# Patient Record
Sex: FEMALE | Race: WHITE | NOT HISPANIC OR LATINO | Employment: PART TIME | URBAN - METROPOLITAN AREA
[De-identification: names, ages, dates, MRNs, and addresses within clinical notes are randomized per-mention and may not be internally consistent; named-entity substitution may affect disease eponyms.]

---

## 2022-11-07 ENCOUNTER — OFFICE VISIT (OUTPATIENT)
Dept: OBSTETRICS AND GYNECOLOGY | Facility: CLINIC | Age: 22
End: 2022-11-07

## 2022-11-07 VITALS — WEIGHT: 140 LBS | DIASTOLIC BLOOD PRESSURE: 60 MMHG | SYSTOLIC BLOOD PRESSURE: 116 MMHG | RESPIRATION RATE: 16 BRPM

## 2022-11-07 DIAGNOSIS — Z30.011 ENCOUNTER FOR INITIAL PRESCRIPTION OF CONTRACEPTIVE PILLS: ICD-10-CM

## 2022-11-07 DIAGNOSIS — N39.0 RECURRENT UTI: ICD-10-CM

## 2022-11-07 DIAGNOSIS — Z12.4 PAP SMEAR FOR CERVICAL CANCER SCREENING: ICD-10-CM

## 2022-11-07 DIAGNOSIS — Z20.2 POSSIBLE EXPOSURE TO STD: ICD-10-CM

## 2022-11-07 DIAGNOSIS — Z01.419 ENCOUNTER FOR GYNECOLOGICAL EXAMINATION WITHOUT ABNORMAL FINDING: Primary | ICD-10-CM

## 2022-11-07 PROCEDURE — 99385 PREV VISIT NEW AGE 18-39: CPT | Performed by: NURSE PRACTITIONER

## 2022-11-07 RX ORDER — NORGESTIMATE AND ETHINYL ESTRADIOL 0.25-0.035
KIT ORAL
COMMUNITY
Start: 2022-09-12 | End: 2022-11-07 | Stop reason: ALTCHOICE

## 2022-11-07 RX ORDER — METHENAMINE HIPPURATE 1000 MG/1
1 TABLET ORAL 2 TIMES DAILY
COMMUNITY
Start: 2022-10-03

## 2022-11-07 NOTE — PROGRESS NOTES
Annual Visit     Patient Name: Deyanira Telles  : 2000   MRN: [id number]   Care Team: Patient Care Team:  Provider, No Known as PCP - Aurora Fairchild APRN as Nurse Practitioner (Nurse Practitioner)    Chief Complaint:    Chief Complaint   Patient presents with   • Annual Exam     Recurrent UTI's, has been to the urologist recently       HPI: Deyanira Telles is a 22 y.o. year old  presenting to be seen for her gynecologic exam - new pt.  1st pap smear today     Sexually active in a monogamous relationship   Taking Estarylla COCs - c/o mood changes with pills   Would like to discuss other options   Has taken Blisovi before with BTB     Hx recurrent UTIs   Has had 6 this calendar yr   Saw Dr. Varela recently and was given medication to try - states she had side effects with it so she stopped taking it   Has f/u scheduled with him in 3 months   States approx 3 of the UTIs occurred after intercourse, but the other 3 didn't     C/o dyspareunia but not always only occasionally   No postcoital bldg   No vaginal c/o   States sometimes she does have vaginal itching but typically occurs around the first couple of days before and after her period     She works part time currently at a medical office at [facility]     Subjective      I have reviewed the patients family history, social history, past medical history, past surgical history and have updated it as appropriate.    /60   Resp 16   Wt 63.5 kg (140 lb)   LMP 10/17/2022     BMI reviewed: There is no height or weight on file to calculate BMI.      Objective     Physical Exam    Neuro: alert and oriented to person, place and time   General:  alert; cooperative; well developed; well nourished   Skin:  No suspicious lesions seen   Thyroid: normal to inspection and palpation   Lungs:  breathing is unlabored  clear to auscultation bilaterally   Heart:  regular rate and rhythm, S1, S2 normal, no murmur, click, rub or gallop  normal  apical impulse   Breasts:  Examined in supine position  Symmetric without masses or skin dimpling  Nipples normal without inversion, lesions or discharge  There are no palpable axillary nodes  Fibrocystic changes are present both breasts without a discrete mass   Abdomen: soft, non-tender; no masses  no umbilical or inguinal hernias are present  no hepato-splenomegaly   Pelvis: Clinical staff was present for exam  External genitalia:  normal appearance of the external genitalia including Bartholin's and Walthill's glands.  :  urethral meatus normal;  Vaginal:  normal pink mucosa without prolapse or lesions.  Cervix:  normal appearance.  Uterus:  normal size, shape and consistency.  Adnexa:  normal bimanual exam of the adnexa.  Rectal:  digital rectal exam not performed; anus visually normal appearing.         Assessment / Plan      Assessment  Problems Addressed This Visit    ICD-10-CM ICD-9-CM   1. Encounter for gynecological examination without abnormal finding  Z01.419 V72.31   2. Pap smear for cervical cancer screening  Z12.4 V76.2   3. Possible exposure to STD  Z20.2 V01.6   4. Encounter for initial prescription of contraceptive pills  Z30.011 V25.01   5. Recurrent UTI  N39.0 599.0       Plan    Pap smear and STI screening pending   Discussed monthly SBEs and fibrocystic breast changes     No C/is to COCs   Will complete current pill pkg then start Nextstellis - samples x 2 months given   Will call for script before samples completed - discussed [facility]   F/u in office in 3 months  Discussed condom use during first 2 wks of Nextstellis   Discussed avoidance of NSAIDs with nextstellis     She will monitor dyspareunia and see if positional related   If postcoital bldg develops or if not related position, she will RTC     Discussed UTI prevention measures   She will contact Dr. De La Torre's office to let him know that she stopped medication and to f/u     [facility] 1 yr   F/u in office in 3 months          19 to 39: Counseling/Anticipatory Guidance Discussed: family planning/contraception, sexual behavior and STDs and breast cancer and self breast exams    Follow Up  Return in about 3 months (around 2/7/2023) for Recheck.  Patient was given instructions and counseling regarding her condition or for health maintenance advice. Please see specific information pulled into the AVS if appropriate.     Aurora Lemus, CHEYANNE  November 7, 2022  11:32 EST

## 2022-11-08 ENCOUNTER — PATIENT ROUNDING (BHMG ONLY) (OUTPATIENT)
Dept: OBSTETRICS AND GYNECOLOGY | Facility: CLINIC | Age: 22
End: 2022-11-08

## 2022-11-08 NOTE — PROGRESS NOTES
A [employer] message has been sent to the patient for PATIENT ROUNDING with [facility].

## 2022-11-10 LAB — REF LAB TEST METHOD: NORMAL

## 2022-12-15 ENCOUNTER — TELEPHONE (OUTPATIENT)
Dept: OBSTETRICS AND GYNECOLOGY | Facility: CLINIC | Age: 22
End: 2022-12-15

## 2022-12-15 RX ORDER — DROSPIRENONE AND ESTETROL 3-14.2(28)
1 KIT ORAL DAILY
Qty: 84 TABLET | Refills: 3 | Status: SHIPPED | OUTPATIENT
Start: 2022-12-15 | End: 2022-12-15 | Stop reason: SDUPTHER

## 2022-12-15 RX ORDER — DROSPIRENONE AND ESTETROL 3-14.2(28)
1 KIT ORAL DAILY
Qty: 84 TABLET | Refills: 3 | Status: SHIPPED | OUTPATIENT
Start: 2022-12-15 | End: 2023-04-05 | Stop reason: ALTCHOICE

## 2022-12-15 NOTE — TELEPHONE ENCOUNTER
Please let her know that I sent in a script for Nextstellis to [facility].  I typically send it to [facility] like we discussed at her visit, in order to get the best price, so if I need to send it there, just let me know.

## 2022-12-15 NOTE — TELEPHONE ENCOUNTER
PT CALLING WAS GIVEN A SAMPLE OF BIRTH CONTROL AND SHE LIKES IT IS ASKING FOR A FULL PRESCRIPTION BE SENT TO [facility]

## 2023-04-05 ENCOUNTER — OFFICE VISIT (OUTPATIENT)
Dept: OBSTETRICS AND GYNECOLOGY | Facility: CLINIC | Age: 23
End: 2023-04-05
Payer: COMMERCIAL

## 2023-04-05 VITALS — DIASTOLIC BLOOD PRESSURE: 70 MMHG | SYSTOLIC BLOOD PRESSURE: 116 MMHG | RESPIRATION RATE: 16 BRPM | WEIGHT: 141 LBS

## 2023-04-05 DIAGNOSIS — Z30.011 ENCOUNTER FOR INITIAL PRESCRIPTION OF CONTRACEPTIVE PILLS: Primary | ICD-10-CM

## 2023-04-05 DIAGNOSIS — R53.83 FATIGUE, UNSPECIFIED TYPE: ICD-10-CM

## 2023-04-05 DIAGNOSIS — N92.1 BREAKTHROUGH BLEEDING ON BIRTH CONTROL PILLS: ICD-10-CM

## 2023-04-05 PROCEDURE — 99214 OFFICE O/P EST MOD 30 MIN: CPT | Performed by: NURSE PRACTITIONER

## 2023-04-05 RX ORDER — NORGESTREL AND ETHINYL ESTRADIOL 0.3-0.03MG
1 KIT ORAL DAILY
Qty: 28 TABLET | Refills: 3 | Status: SHIPPED | OUTPATIENT
Start: 2023-04-05

## 2023-04-05 NOTE — PROGRESS NOTES
Problem Visit     Patient Name: Deyanira Telles  : 2000   MRN: [id number]   Care Team: Patient Care Team:  Provider, No Known as PCP - Aurora Fairchild APRN as Nurse Practitioner (Nurse Practitioner)    Chief Complaint:    Chief Complaint   Patient presents with   • Follow-up     ocp's       HPI: Deyanira Telles is a 22 y.o. year old  presenting to be seen for 3 month f/u Nexstellis start.   Changed to Nexstellis at annual visit in Nov d/t mood changes with Estarylla   Has been taking Nextstellis now for 3 full months - at end of current pill pkg   States period begins during the placebo pills, may be the first day or last day, and then continues 3-4 days into new pill pkg   This has been consistent each month   Mood changes are also present - increase in anxiety   Decreased libido as well     Mood changes with Estarylla   Has taken Blisovi before with extended periods - would have bldg into 1st wk of pill pkg   Faithful with daily pill use - no late or missed pills      Hx recurrent UTIs   Seeing Dr. Varela but not currently taking medication he prescribed for prevention and he is aware of this   She is doing ok so far without it      C/o dyspareunia but not always only occasionally - she does confirm that this is with certain positions only   No postcoital bldg   No vaginal c/o   States sometimes she does have vaginal itching but typically occurs around the first couple of days before and after her period  She does also notice green vaginal d/c around the time of her period but always resolves   Denies s/sx today       Subjective      I have reviewed the patients family history, social history, past medical history, past surgical history and have updated it as appropriate.    /70   Resp 16   Wt 64 kg (141 lb)   LMP 2023     BMI reviewed: There is no height or weight on file to calculate BMI.      Objective     Physical Exam      Neuro: alert and oriented to person, place and  time   General:  alert; cooperative; well developed; well nourished   Skin:  Not performed.   Thyroid: not examined   Lungs:  breathing is unlabored   Heart:  Not performed.   Breasts:  Not performed.   Abdomen: Not performed.   Pelvis: Not performed.     Declines pelvic exam today     Assessment / Plan      Assessment  Problems Addressed This Visit    ICD-10-CM ICD-9-CM   1. Encounter for initial prescription of contraceptive pills  Z30.011 V25.01   2. Breakthrough bleeding on birth control pills  N92.1 626.6   3. Fatigue, unspecified type  R53.83 780.79       Plan    In depth discussion of contraceptive options   States she really prefers a BCP   No labs in the last yr or pelvic u/s   Order TSH, CBC, and CMP   States she also feels very fatigued frequently   Pelvic u/s ordered to r/o additional causes of BTB   STI screening done at [facility] in Nov 2022 negative results - no new partners   Will call to discuss lab and u/s report   Complete current Nextstellis pkg then start Low ogestrel   F/u in office in 3 months     Discussed if vaginal d/c returns, may try OTC yeast txment or RTC for exam - pt v/u     [facility] due Nov 2023   F/u in office in 3 months             Follow Up  Return in about 3 months (around 7/5/2023) for Recheck.  Patient was given instructions and counseling regarding her condition or for health maintenance advice. Please see specific information pulled into the AVS if appropriate.     Aurora Lemus, APRN  April 5, 2023  14:37 EDT

## 2023-04-17 ENCOUNTER — LAB (OUTPATIENT)
Dept: LAB | Facility: HOSPITAL | Age: 23
End: 2023-04-17
Payer: COMMERCIAL

## 2023-04-17 DIAGNOSIS — N92.1 BREAKTHROUGH BLEEDING ON BIRTH CONTROL PILLS: ICD-10-CM

## 2023-04-17 DIAGNOSIS — R53.83 FATIGUE, UNSPECIFIED TYPE: ICD-10-CM

## 2023-04-17 LAB
ALBUMIN SERPL-MCNC: 4 G/DL (ref 3.5–5.2)
ALBUMIN/GLOB SERPL: 1.6 G/DL
ALP SERPL-CCNC: 48 U/L (ref 39–117)
ALT SERPL W P-5'-P-CCNC: 13 U/L (ref 1–33)
ANION GAP SERPL CALCULATED.3IONS-SCNC: 7 MMOL/L (ref 5–15)
AST SERPL-CCNC: 18 U/L (ref 1–32)
BILIRUB SERPL-MCNC: 0.3 MG/DL (ref 0–1.2)
BUN SERPL-MCNC: 13 MG/DL (ref 6–20)
BUN/CREAT SERPL: 17.3 (ref 7–25)
CALCIUM SPEC-SCNC: 8.6 MG/DL (ref 8.6–10.5)
CHLORIDE SERPL-SCNC: 106 MMOL/L (ref 98–107)
CO2 SERPL-SCNC: 25 MMOL/L (ref 22–29)
CREAT SERPL-MCNC: 0.75 MG/DL (ref 0.57–1)
DEPRECATED RDW RBC AUTO: 38 FL (ref 37–54)
EGFRCR SERPLBLD CKD-EPI 2021: 116.3 ML/MIN/1.73
ERYTHROCYTE [DISTWIDTH] IN BLOOD BY AUTOMATED COUNT: 11.4 % (ref 12.3–15.4)
GLOBULIN UR ELPH-MCNC: 2.5 GM/DL
GLUCOSE SERPL-MCNC: 83 MG/DL (ref 65–99)
HCT VFR BLD AUTO: 37.6 % (ref 34–46.6)
HGB BLD-MCNC: 13.1 G/DL (ref 12–15.9)
MCH RBC QN AUTO: 31.9 PG (ref 26.6–33)
MCHC RBC AUTO-ENTMCNC: 34.8 G/DL (ref 31.5–35.7)
MCV RBC AUTO: 91.5 FL (ref 79–97)
PLATELET # BLD AUTO: 316 10*3/MM3 (ref 140–450)
PMV BLD AUTO: 9.6 FL (ref 6–12)
POTASSIUM SERPL-SCNC: 3.9 MMOL/L (ref 3.5–5.2)
PROT SERPL-MCNC: 6.5 G/DL (ref 6–8.5)
RBC # BLD AUTO: 4.11 10*6/MM3 (ref 3.77–5.28)
SODIUM SERPL-SCNC: 138 MMOL/L (ref 136–145)
TSH SERPL DL<=0.05 MIU/L-ACNC: 1.67 UIU/ML (ref 0.27–4.2)
WBC NRBC COR # BLD: 8.61 10*3/MM3 (ref 3.4–10.8)

## 2023-04-17 PROCEDURE — 80050 GENERAL HEALTH PANEL: CPT

## 2023-05-09 RX ORDER — NORGESTIMATE AND ETHINYL ESTRADIOL 0.25-0.035
1 KIT ORAL DAILY
Qty: 84 TABLET | Refills: 1 | Status: SHIPPED | OUTPATIENT
Start: 2023-05-09

## 2023-10-25 PROCEDURE — 87086 URINE CULTURE/COLONY COUNT: CPT | Performed by: PHYSICIAN ASSISTANT

## 2023-11-06 ENCOUNTER — TELEPHONE (OUTPATIENT)
Dept: URGENT CARE | Facility: CLINIC | Age: 23
End: 2023-11-06
Payer: COMMERCIAL